# Patient Record
Sex: FEMALE | Race: WHITE | URBAN - METROPOLITAN AREA
[De-identification: names, ages, dates, MRNs, and addresses within clinical notes are randomized per-mention and may not be internally consistent; named-entity substitution may affect disease eponyms.]

---

## 2019-09-05 ENCOUNTER — NEW PATIENT (OUTPATIENT)
Dept: URBAN - METROPOLITAN AREA CLINIC 64 | Facility: CLINIC | Age: 64
End: 2019-09-05
Payer: MEDICARE

## 2019-09-05 PROCEDURE — 92004 COMPRE OPH EXAM NEW PT 1/>: CPT | Performed by: OPTOMETRIST

## 2019-09-05 ASSESSMENT — VISUAL ACUITY
OD: 20/25
OS: 20/30

## 2019-09-05 ASSESSMENT — KERATOMETRY
OD: 39.71
OS: 38.87

## 2019-09-23 ENCOUNTER — NEW PATIENT (OUTPATIENT)
Dept: URBAN - METROPOLITAN AREA CLINIC 64 | Facility: CLINIC | Age: 64
End: 2019-09-23
Payer: MEDICARE

## 2019-09-23 DIAGNOSIS — H52.213 IRREGULAR ASTIGMATISM, BILATERAL: ICD-10-CM

## 2019-09-23 PROCEDURE — 92004 COMPRE OPH EXAM NEW PT 1/>: CPT | Performed by: OPHTHALMOLOGY

## 2019-09-23 ASSESSMENT — VISUAL ACUITY
OD: 20/25
OS: 20/30

## 2019-09-23 ASSESSMENT — INTRAOCULAR PRESSURE
OS: 12
OD: 13

## 2019-09-23 ASSESSMENT — KERATOMETRY
OD: 39.71
OS: 38.87

## 2019-10-01 ENCOUNTER — Encounter (OUTPATIENT)
Dept: URBAN - METROPOLITAN AREA CLINIC 64 | Facility: CLINIC | Age: 64
End: 2019-10-01
Payer: MEDICARE

## 2019-10-01 DIAGNOSIS — H25.13 AGE-RELATED NUCLEAR CATARACT, BILATERAL: ICD-10-CM

## 2019-10-01 DIAGNOSIS — H25.813 COMBINED FORMS OF AGE-RELATED CATARACT, BILATERAL: ICD-10-CM

## 2019-10-01 DIAGNOSIS — Z01.818 ENCOUNTER FOR OTHER PREPROCEDURAL EXAMINATION: Primary | ICD-10-CM

## 2019-10-01 PROCEDURE — 99213 OFFICE O/P EST LOW 20 MIN: CPT | Performed by: NURSE PRACTITIONER

## 2019-10-08 ENCOUNTER — SURGERY (OUTPATIENT)
Dept: URBAN - METROPOLITAN AREA SURGERY 42 | Facility: SURGERY | Age: 64
End: 2019-10-08
Payer: MEDICARE

## 2019-10-08 PROCEDURE — 66984 XCAPSL CTRC RMVL W/O ECP: CPT | Performed by: OPHTHALMOLOGY

## 2019-10-09 ENCOUNTER — POST OP (OUTPATIENT)
Dept: URBAN - METROPOLITAN AREA CLINIC 64 | Facility: CLINIC | Age: 64
End: 2019-10-09

## 2019-10-09 PROCEDURE — 99024 POSTOP FOLLOW-UP VISIT: CPT | Performed by: OPTOMETRIST

## 2019-10-09 ASSESSMENT — INTRAOCULAR PRESSURE: OS: 24

## 2019-10-11 ENCOUNTER — POST OP (OUTPATIENT)
Dept: URBAN - METROPOLITAN AREA CLINIC 64 | Facility: CLINIC | Age: 64
End: 2019-10-11

## 2019-10-11 PROCEDURE — 99024 POSTOP FOLLOW-UP VISIT: CPT | Performed by: OPTOMETRIST

## 2019-10-16 ENCOUNTER — POST OP (OUTPATIENT)
Dept: URBAN - METROPOLITAN AREA CLINIC 64 | Facility: CLINIC | Age: 64
End: 2019-10-16

## 2019-10-16 PROCEDURE — 99024 POSTOP FOLLOW-UP VISIT: CPT | Performed by: OPTOMETRIST

## 2019-10-16 ASSESSMENT — VISUAL ACUITY: OS: 20/25

## 2019-10-16 ASSESSMENT — INTRAOCULAR PRESSURE
OS: 18
OD: 17

## 2019-10-17 ENCOUNTER — SURGERY (OUTPATIENT)
Dept: URBAN - METROPOLITAN AREA SURGERY 42 | Facility: SURGERY | Age: 64
End: 2019-10-17
Payer: MEDICARE

## 2019-10-17 PROCEDURE — 66984 XCAPSL CTRC RMVL W/O ECP: CPT | Performed by: OPHTHALMOLOGY

## 2019-10-18 ENCOUNTER — POST OP (OUTPATIENT)
Dept: URBAN - METROPOLITAN AREA CLINIC 64 | Facility: CLINIC | Age: 64
End: 2019-10-18

## 2019-10-18 PROCEDURE — 99024 POSTOP FOLLOW-UP VISIT: CPT | Performed by: OPTOMETRIST

## 2019-10-18 ASSESSMENT — INTRAOCULAR PRESSURE
OD: 32
OS: 17

## 2019-10-21 ENCOUNTER — POST OP (OUTPATIENT)
Dept: URBAN - METROPOLITAN AREA CLINIC 64 | Facility: CLINIC | Age: 64
End: 2019-10-21

## 2019-10-21 PROCEDURE — 99024 POSTOP FOLLOW-UP VISIT: CPT | Performed by: OPTOMETRIST

## 2019-10-21 ASSESSMENT — VISUAL ACUITY
OS: 20/20
OD: 20/20

## 2019-11-07 ENCOUNTER — POST OP (OUTPATIENT)
Dept: URBAN - METROPOLITAN AREA CLINIC 64 | Facility: CLINIC | Age: 64
End: 2019-11-07

## 2019-11-07 PROCEDURE — 99024 POSTOP FOLLOW-UP VISIT: CPT | Performed by: OPTOMETRIST

## 2019-11-07 ASSESSMENT — VISUAL ACUITY
OS: 20/25
OD: 20/20

## 2019-11-07 ASSESSMENT — INTRAOCULAR PRESSURE
OS: 10
OD: 13

## 2020-02-04 ENCOUNTER — FOLLOW UP ESTABLISHED (OUTPATIENT)
Dept: URBAN - METROPOLITAN AREA CLINIC 64 | Facility: CLINIC | Age: 65
End: 2020-02-04
Payer: MEDICARE

## 2020-02-04 PROCEDURE — 92012 INTRM OPH EXAM EST PATIENT: CPT | Performed by: OPTOMETRIST

## 2020-02-04 ASSESSMENT — INTRAOCULAR PRESSURE
OS: 10
OD: 8

## 2020-02-04 ASSESSMENT — KERATOMETRY
OD: 39.91
OS: 38.76

## 2021-07-23 ENCOUNTER — OFFICE VISIT (OUTPATIENT)
Dept: URBAN - METROPOLITAN AREA CLINIC 64 | Facility: CLINIC | Age: 66
End: 2021-07-23
Payer: MEDICARE

## 2021-07-23 DIAGNOSIS — Z96.1 PRESENCE OF INTRAOCULAR LENS: ICD-10-CM

## 2021-07-23 DIAGNOSIS — H52.4 PRESBYOPIA: ICD-10-CM

## 2021-07-23 DIAGNOSIS — H26.493 OTHER SECONDARY CATARACT, BILATERAL: ICD-10-CM

## 2021-07-23 DIAGNOSIS — H52.223 REGULAR ASTIGMATISM, BILATERAL: Primary | ICD-10-CM

## 2021-07-23 PROCEDURE — 92014 COMPRE OPH EXAM EST PT 1/>: CPT | Performed by: OPTOMETRIST

## 2021-07-23 ASSESSMENT — VISUAL ACUITY
OS: 20/25
OD: 20/20

## 2021-07-23 ASSESSMENT — KERATOMETRY
OD: 39.71
OS: 38.33

## 2021-07-23 ASSESSMENT — INTRAOCULAR PRESSURE
OD: 9
OS: 11

## 2021-07-23 NOTE — IMPRESSION/PLAN
Impression: Other secondary cataract, bilateral: H26.493. Plan: Very mild. No treatment required at this time. Monitor annually.

## 2021-07-23 NOTE — IMPRESSION/PLAN
Impression: Presence of intraocular lens ; OU Plan: Discussed diagnosis in detail with patient. Doing well. Monitor annually.

## 2021-07-23 NOTE — IMPRESSION/PLAN
Impression: Regular astigmatism, bilateral: H52.223. Plan: OS>OD. S/p TORIC IOL OS. Recommend Rx glasses to correct astig. Pt will consider.

## 2023-05-02 ENCOUNTER — OFFICE VISIT (OUTPATIENT)
Dept: URBAN - METROPOLITAN AREA CLINIC 64 | Facility: CLINIC | Age: 68
End: 2023-05-02
Payer: MEDICARE

## 2023-05-02 DIAGNOSIS — H26.493 OTHER SECONDARY CATARACT, BILATERAL: Primary | ICD-10-CM

## 2023-05-02 DIAGNOSIS — Z96.1 PRESENCE OF PSEUDOPHAKIA: ICD-10-CM

## 2023-05-02 PROCEDURE — 99213 OFFICE O/P EST LOW 20 MIN: CPT | Performed by: OPTOMETRIST

## 2023-05-02 ASSESSMENT — INTRAOCULAR PRESSURE
OD: 12
OS: 10

## 2023-05-02 ASSESSMENT — KERATOMETRY
OD: 39.48
OS: 38.89

## 2023-05-02 NOTE — IMPRESSION/PLAN
Impression: Presence of pseudophakia: Z96.1. Plan: S/p CE/IOL OU, lens well positioned and clear. Monitor with yearly eye exams. H/O RK cuts, discussed limiting VA, especially at near. Continue with OTC readers.

## 2023-05-02 NOTE — IMPRESSION/PLAN
Impression: Other secondary cataract, bilateral: H26.493. Plan: No treatment currently recommended due to level of vision. Patient will monitor vision changes and contact us with any decrease in vision, will re-evaluate cataract on next visit.

## 2024-05-02 ENCOUNTER — OFFICE VISIT (OUTPATIENT)
Dept: URBAN - METROPOLITAN AREA CLINIC 64 | Facility: LOCATION | Age: 69
End: 2024-05-02
Payer: MEDICARE

## 2024-05-02 DIAGNOSIS — Z96.1 PRESENCE OF PSEUDOPHAKIA: Primary | ICD-10-CM

## 2024-05-02 DIAGNOSIS — H52.223 REGULAR ASTIGMATISM, BILATERAL: ICD-10-CM

## 2024-05-02 PROCEDURE — 99214 OFFICE O/P EST MOD 30 MIN: CPT | Performed by: OPTOMETRIST

## 2024-05-02 ASSESSMENT — VISUAL ACUITY
OD: 20/20
OS: 20/20

## 2024-05-02 ASSESSMENT — INTRAOCULAR PRESSURE
OD: 18
OS: 18

## 2025-05-05 ENCOUNTER — OFFICE VISIT (OUTPATIENT)
Dept: URBAN - METROPOLITAN AREA CLINIC 64 | Facility: LOCATION | Age: 70
End: 2025-05-05
Payer: MEDICARE

## 2025-05-05 DIAGNOSIS — H26.493 OTHER SECONDARY CATARACT, BILATERAL: ICD-10-CM

## 2025-05-05 DIAGNOSIS — H52.223 REGULAR ASTIGMATISM, BILATERAL: Primary | ICD-10-CM

## 2025-05-05 PROCEDURE — 99214 OFFICE O/P EST MOD 30 MIN: CPT | Performed by: OPTOMETRIST

## 2025-05-05 ASSESSMENT — INTRAOCULAR PRESSURE
OD: 12
OS: 16